# Patient Record
Sex: FEMALE | Race: WHITE | NOT HISPANIC OR LATINO | URBAN - METROPOLITAN AREA
[De-identification: names, ages, dates, MRNs, and addresses within clinical notes are randomized per-mention and may not be internally consistent; named-entity substitution may affect disease eponyms.]

---

## 2017-08-12 ENCOUNTER — EMERGENCY (EMERGENCY)
Facility: HOSPITAL | Age: 22
LOS: 1 days | Discharge: HOME | End: 2017-08-12
Attending: EMERGENCY MEDICINE
Payer: SELF-PAY

## 2017-08-12 VITALS
DIASTOLIC BLOOD PRESSURE: 78 MMHG | RESPIRATION RATE: 18 BRPM | TEMPERATURE: 99 F | OXYGEN SATURATION: 99 % | HEART RATE: 101 BPM | HEIGHT: 68 IN | SYSTOLIC BLOOD PRESSURE: 130 MMHG | WEIGHT: 169.98 LBS

## 2017-08-12 PROCEDURE — 99282 EMERGENCY DEPT VISIT SF MDM: CPT

## 2017-08-12 PROCEDURE — 99283 EMERGENCY DEPT VISIT LOW MDM: CPT

## 2017-08-12 NOTE — ED STATDOCS - ATTENDING CONTRIBUTION TO CARE
I, Kendal Cee, performed the initial face to face bedside interview with this patient regarding history of present illness, review of symptoms and relevant past medical, social and family history.  I completed an independent physical examination.  I was the initial provider who evaluated this patient.  The history, relevant review of systems, past medical and surgical history, medical decision making, and physical examination was documented by the scribe in my presence and I attest to the accuracy of the documentation.  I have signed out the follow up of any pending tests (i.e. labs, radiological studies) to the ACP.  I have communicated the patient’s plan of care and disposition with the ACP.

## 2017-08-12 NOTE — ED STATDOCS - PROGRESS NOTE DETAILS
Patient denies of contact lens or glass usage. She reports that the burning sensation has resolved.   Bilat eye exam - VA 20/20 individually uncorrected. No stain uptake with Wood's Lamp. No FB noted.   PLAN: Will discharge with EYE follow-up as needed

## 2017-08-12 NOTE — ED ADULT NURSE NOTE - CHIEF COMPLAINT QUOTE
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

## 2017-08-12 NOTE — ED STATDOCS - MEDICAL DECISION MAKING DETAILS
Will obtain visual acuity, PH, and Wood's lamp exam. Will obtain visual acuity, check pH and perform Wood's lamp exam.

## 2017-08-12 NOTE — ED ADULT TRIAGE NOTE - CHIEF COMPLAINT QUOTE
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

## 2017-08-12 NOTE — ED STATDOCS - OBJECTIVE STATEMENT
23 y/o F w/ no significant PMHx presents to the ED c/o mouth tingling and eye burning bilat s/p generator fluid slashing onto her face today. She also states she is dizzy. Pt notes the fluid smelt oily. Pt flushed her eyes out with Visine and a water fountain. She does not wear contacts. Pt denies LOC, visual changes, fever or any other complaints at this time. This patient is a 21 y/o woman w/ no significant PMHx presents to the ED c/o mouth tingling and eye burning bilat s/p generator fluid slashing onto her face today. She also states she is dizzy. Pt notes the fluid had a gasoline smell to it.  Pt flushed her eyes out with water and put Visine drops in her eyes.  Pt denies burning and visual changes.

## 2017-11-16 ENCOUNTER — EMERGENCY (EMERGENCY)
Facility: HOSPITAL | Age: 22
LOS: 1 days | Discharge: HOME | End: 2017-11-16
Attending: EMERGENCY MEDICINE
Payer: COMMERCIAL

## 2017-11-16 VITALS
DIASTOLIC BLOOD PRESSURE: 78 MMHG | WEIGHT: 175.05 LBS | RESPIRATION RATE: 18 BRPM | HEART RATE: 98 BPM | OXYGEN SATURATION: 99 % | TEMPERATURE: 98 F | SYSTOLIC BLOOD PRESSURE: 130 MMHG | HEIGHT: 68 IN

## 2017-11-16 PROCEDURE — 12001 RPR S/N/AX/GEN/TRNK 2.5CM/<: CPT

## 2017-11-16 PROCEDURE — 99282 EMERGENCY DEPT VISIT SF MDM: CPT | Mod: 25

## 2017-11-16 RX ORDER — TETANUS TOXOID, REDUCED DIPHTHERIA TOXOID AND ACELLULAR PERTUSSIS VACCINE, ADSORBED 5; 2.5; 8; 8; 2.5 [IU]/.5ML; [IU]/.5ML; UG/.5ML; UG/.5ML; UG/.5ML
0.5 SUSPENSION INTRAMUSCULAR ONCE
Qty: 0 | Refills: 0 | Status: DISCONTINUED | OUTPATIENT
Start: 2017-11-16 | End: 2017-11-16

## 2017-11-16 RX ORDER — IBUPROFEN 200 MG
800 TABLET ORAL ONCE
Qty: 0 | Refills: 0 | Status: DISCONTINUED | OUTPATIENT
Start: 2017-11-16 | End: 2017-11-16

## 2017-11-16 NOTE — ED STATDOCS - OBJECTIVE STATEMENT
23 y/o F pt with no pertinent past medical hx, presents to ED c/o lacerations on her right hand s/p washing dishes. She states she was washing dishes when one of them broke. Tetanus is not UTD. She is right hand dominant. Denies any other injuries or traumas. Denies N/V/D, fever, chills, SOB, CP, and abdominal pain. No further complaints at this time. 21 y/o F pt with no pertinent past medical hx, presents to ED c/o lacerations on her right hand s/p washing dishes. She states she was washing dishes when one of them broke. Tetanus is  UTD. She is right hand dominant. Denies any other injuries or traumas. Denies N/V/D, fever, chills, SOB, CP, and abdominal pain. No further complaints at this time.

## 2017-11-16 NOTE — ED STATDOCS - PROGRESS NOTE DETAILS
Pt seen and evaluated. Agree with HPI/PE and plan. + crescent shaped lac to 1st mcp area. No muscle, tendon or joint invol. No fb. dT UTD.  Lac repair and local wound care

## 2017-11-16 NOTE — ED STATDOCS - MEDICAL DECISION MAKING DETAILS
Laceration super track irrigation simple closure. Tetanus Laceration super track irrigation simple closure.

## 2017-11-16 NOTE — ED STATDOCS - SKIN, MLM
c shaped laceration 2cm from end to end over dorsal aspect of the right thumb just proximal to the PIP

## 2017-11-16 NOTE — ED STATDOCS - ATTENDING CONTRIBUTION TO CARE
I, Shawn Dewitt, performed the initial face to face bedside interview with this patient regarding history of present illness, review of symptoms and relevant past medical, social and family history.  I completed an independent physical examination.  I was the initial provider who evaluated this patient. I have signed out the follow up of any pending tests (i.e. labs, radiological studies) to the ACP.  I have communicated the patient’s plan of care and disposition with the ACP.

## 2022-08-06 ENCOUNTER — EMERGENCY (EMERGENCY)
Facility: HOSPITAL | Age: 27
LOS: 1 days | Discharge: HOME | End: 2022-08-06
Attending: STUDENT IN AN ORGANIZED HEALTH CARE EDUCATION/TRAINING PROGRAM
Payer: SELF-PAY

## 2022-08-06 VITALS
HEIGHT: 68 IN | SYSTOLIC BLOOD PRESSURE: 130 MMHG | DIASTOLIC BLOOD PRESSURE: 77 MMHG | OXYGEN SATURATION: 98 % | WEIGHT: 190.04 LBS | HEART RATE: 88 BPM | RESPIRATION RATE: 18 BRPM | TEMPERATURE: 98 F

## 2022-08-06 LAB
ALBUMIN SERPL ELPH-MCNC: 3.8 G/DL — SIGNIFICANT CHANGE UP (ref 3.3–5.2)
ALP SERPL-CCNC: 49 U/L — SIGNIFICANT CHANGE UP (ref 40–120)
ALT FLD-CCNC: 15 U/L — SIGNIFICANT CHANGE UP
ANION GAP SERPL CALC-SCNC: 11 MMOL/L — SIGNIFICANT CHANGE UP (ref 5–17)
APPEARANCE UR: ABNORMAL
AST SERPL-CCNC: 16 U/L — SIGNIFICANT CHANGE UP
BACTERIA # UR AUTO: ABNORMAL
BASOPHILS # BLD AUTO: 0.04 K/UL — SIGNIFICANT CHANGE UP (ref 0–0.2)
BASOPHILS NFR BLD AUTO: 0.5 % — SIGNIFICANT CHANGE UP (ref 0–2)
BILIRUB SERPL-MCNC: 0.2 MG/DL — LOW (ref 0.4–2)
BILIRUB UR-MCNC: NEGATIVE — SIGNIFICANT CHANGE UP
BUN SERPL-MCNC: 11.5 MG/DL — SIGNIFICANT CHANGE UP (ref 8–20)
CALCIUM SERPL-MCNC: 8.8 MG/DL — SIGNIFICANT CHANGE UP (ref 8.4–10.5)
CHLORIDE SERPL-SCNC: 105 MMOL/L — SIGNIFICANT CHANGE UP (ref 98–107)
CO2 SERPL-SCNC: 22 MMOL/L — SIGNIFICANT CHANGE UP (ref 22–29)
COLOR SPEC: YELLOW — SIGNIFICANT CHANGE UP
CREAT SERPL-MCNC: 0.68 MG/DL — SIGNIFICANT CHANGE UP (ref 0.5–1.3)
DIFF PNL FLD: ABNORMAL
EGFR: 122 ML/MIN/1.73M2 — SIGNIFICANT CHANGE UP
EOSINOPHIL # BLD AUTO: 0.16 K/UL — SIGNIFICANT CHANGE UP (ref 0–0.5)
EOSINOPHIL NFR BLD AUTO: 2.1 % — SIGNIFICANT CHANGE UP (ref 0–6)
EPI CELLS # UR: SIGNIFICANT CHANGE UP
GLUCOSE SERPL-MCNC: 88 MG/DL — SIGNIFICANT CHANGE UP (ref 70–99)
GLUCOSE UR QL: NEGATIVE MG/DL — SIGNIFICANT CHANGE UP
HCG SERPL-ACNC: <4 MIU/ML — SIGNIFICANT CHANGE UP
HCT VFR BLD CALC: 40.3 % — SIGNIFICANT CHANGE UP (ref 34.5–45)
HGB BLD-MCNC: 13.3 G/DL — SIGNIFICANT CHANGE UP (ref 11.5–15.5)
IMM GRANULOCYTES NFR BLD AUTO: 0.3 % — SIGNIFICANT CHANGE UP (ref 0–1.5)
KETONES UR-MCNC: NEGATIVE — SIGNIFICANT CHANGE UP
LEUKOCYTE ESTERASE UR-ACNC: ABNORMAL
LYMPHOCYTES # BLD AUTO: 2.51 K/UL — SIGNIFICANT CHANGE UP (ref 1–3.3)
LYMPHOCYTES # BLD AUTO: 33.5 % — SIGNIFICANT CHANGE UP (ref 13–44)
MCHC RBC-ENTMCNC: 30 PG — SIGNIFICANT CHANGE UP (ref 27–34)
MCHC RBC-ENTMCNC: 33 GM/DL — SIGNIFICANT CHANGE UP (ref 32–36)
MCV RBC AUTO: 91 FL — SIGNIFICANT CHANGE UP (ref 80–100)
MONOCYTES # BLD AUTO: 0.4 K/UL — SIGNIFICANT CHANGE UP (ref 0–0.9)
MONOCYTES NFR BLD AUTO: 5.3 % — SIGNIFICANT CHANGE UP (ref 2–14)
NEUTROPHILS # BLD AUTO: 4.37 K/UL — SIGNIFICANT CHANGE UP (ref 1.8–7.4)
NEUTROPHILS NFR BLD AUTO: 58.3 % — SIGNIFICANT CHANGE UP (ref 43–77)
NITRITE UR-MCNC: NEGATIVE — SIGNIFICANT CHANGE UP
PH UR: 6.5 — SIGNIFICANT CHANGE UP (ref 5–8)
PLATELET # BLD AUTO: 208 K/UL — SIGNIFICANT CHANGE UP (ref 150–400)
POTASSIUM SERPL-MCNC: 4.1 MMOL/L — SIGNIFICANT CHANGE UP (ref 3.5–5.3)
POTASSIUM SERPL-SCNC: 4.1 MMOL/L — SIGNIFICANT CHANGE UP (ref 3.5–5.3)
PROT SERPL-MCNC: 6.1 G/DL — LOW (ref 6.6–8.7)
PROT UR-MCNC: 30 MG/DL
RBC # BLD: 4.43 M/UL — SIGNIFICANT CHANGE UP (ref 3.8–5.2)
RBC # FLD: 12.9 % — SIGNIFICANT CHANGE UP (ref 10.3–14.5)
RBC CASTS # UR COMP ASSIST: ABNORMAL /HPF (ref 0–4)
SODIUM SERPL-SCNC: 138 MMOL/L — SIGNIFICANT CHANGE UP (ref 135–145)
SP GR SPEC: 1.01 — SIGNIFICANT CHANGE UP (ref 1.01–1.02)
UROBILINOGEN FLD QL: NEGATIVE MG/DL — SIGNIFICANT CHANGE UP
WBC # BLD: 7.5 K/UL — SIGNIFICANT CHANGE UP (ref 3.8–10.5)
WBC # FLD AUTO: 7.5 K/UL — SIGNIFICANT CHANGE UP (ref 3.8–10.5)
WBC UR QL: SIGNIFICANT CHANGE UP /HPF (ref 0–5)

## 2022-08-06 PROCEDURE — 87086 URINE CULTURE/COLONY COUNT: CPT

## 2022-08-06 PROCEDURE — 99284 EMERGENCY DEPT VISIT MOD MDM: CPT | Mod: 25

## 2022-08-06 PROCEDURE — 76856 US EXAM PELVIC COMPLETE: CPT | Mod: 26

## 2022-08-06 PROCEDURE — 99285 EMERGENCY DEPT VISIT HI MDM: CPT

## 2022-08-06 PROCEDURE — 80053 COMPREHEN METABOLIC PANEL: CPT

## 2022-08-06 PROCEDURE — 76830 TRANSVAGINAL US NON-OB: CPT | Mod: 26

## 2022-08-06 PROCEDURE — 99053 MED SERV 10PM-8AM 24 HR FAC: CPT

## 2022-08-06 PROCEDURE — 85025 COMPLETE CBC W/AUTO DIFF WBC: CPT

## 2022-08-06 PROCEDURE — 84702 CHORIONIC GONADOTROPIN TEST: CPT

## 2022-08-06 PROCEDURE — 76830 TRANSVAGINAL US NON-OB: CPT

## 2022-08-06 PROCEDURE — 36415 COLL VENOUS BLD VENIPUNCTURE: CPT

## 2022-08-06 PROCEDURE — 81001 URINALYSIS AUTO W/SCOPE: CPT

## 2022-08-06 PROCEDURE — 76856 US EXAM PELVIC COMPLETE: CPT

## 2022-08-06 RX ORDER — SODIUM CHLORIDE 9 MG/ML
1000 INJECTION, SOLUTION INTRAVENOUS ONCE
Refills: 0 | Status: COMPLETED | OUTPATIENT
Start: 2022-08-06 | End: 2022-08-06

## 2022-08-06 RX ADMIN — SODIUM CHLORIDE 1000 MILLILITER(S): 9 INJECTION, SOLUTION INTRAVENOUS at 02:28

## 2022-08-06 NOTE — ED PROVIDER NOTE - DATE/TIME 1
What Type Of Note Output Would You Prefer (Optional)?: Standard Output Have Your Spot(S) Been Treated In The Past?: has not been treated Hpi Title: Evaluation of Skin Lesions 06-Aug-2022 04:32

## 2022-08-06 NOTE — ED PROVIDER NOTE - CLINICAL SUMMARY MEDICAL DECISION MAKING FREE TEXT BOX
Labs and US performed. GYN consulted here. VSS, hemodynamically stable. 28 yo female with no pmhx presents with vaginal bleeding since 9 pm tonight s/p spontaneous expulsion of IUD 2 days ago. Labs and US performed. GYN consulted here. VSS, hemodynamically stable. strict return precautions explained. instructed to f/u with GYN within 24-48 hrs. instructed to f/u with pcp within 24-48 hours as well.

## 2022-08-06 NOTE — ED PROVIDER NOTE - PHYSICAL EXAMINATION
Gen: No acute distress, non toxic  HEENT: Mucous membranes moist, pink conjunctivae, EOMI  CV: RRR, nl s1/s2.  Resp: CTAB, normal rate and effort  GI: Abdomen soft, ND. +minimally ttp in suprapubic region  : No CVAT  Neuro: A&O x 3, moving all 4 extremities  MSK: No spine or joint tenderness to palpation  Skin: No rashes. intact and perfused. Gen: No acute distress, non toxic  HEENT: Mucous membranes moist, pink conjunctivae, EOMI. PERRL. Airway patent, posterior pharynx w/out erythema or exudate. Moist mucous membranes.   CV: RRR, nl s1/s2.  Resp: CTAB, normal rate and effort. No wheezes, rhonchi, or crackles.   GI: Abdomen soft, ND. +minimally ttp in suprapubic region. no guarding or rebound ttp.   : No CVAT  Neuro: A&Ox4, MAEx4. 5/5 str ext x 4. Sensation intact, symmetric throughout. No FND's.  Skin: No rashes, skin intact and well perfused. Cap refill <2sec  Vascular: Radial and dorsalis pedal pulses 2+ b/l. No calf ttp or swelling. No LE edema.

## 2022-08-06 NOTE — ED PROVIDER NOTE - PATIENT PORTAL LINK FT
You can access the FollowMyHealth Patient Portal offered by [facility] by registering at the following website: [email or web address]. By joining [employer]’s FollowMyHealth portal, you will also be able to view your health information using other applications (apps) compatible with our system.

## 2022-08-06 NOTE — ED PROVIDER NOTE - OBJECTIVE STATEMENT
28 yo female with no pmhx presents with vaginal bleeding since 9pm tonight.     Denies fever, chills, body aches, chest pain, SOB 26 yo female with no pmhx presents with vaginal bleeding since 9pm tonight. Pt states 2 stays ago, she had spontaneous expulsion of her IUD. She states when it came out, she experienced some lower abdominal cramping but no vaginal bleeding.     Denies fever, chills, body aches, chest pain, SOB 26 yo female with no pmhx presents with vaginal bleeding since 9pm tonight. Pt states 2 days ago, she had spontaneous expulsion of her IUD. She states when it came out, she experienced some lower abdominal cramping but no vaginal bleeding. She states yesterday, she felt nl and had no symptoms. Tonight she reports she started having vaginal bleeding and some vaginal pain/cramping, She reports she has gone through 6 tampons between 9pm-11:45pm. States she had some passage of "stringy thick blood." She tastes when her IUD came out, it was intact and complete, denies seeing any broken pieces. Pt states she [facility]. Denies fever, chills, body aches, dizziness, LOC, visual changes, chest pain, SOB, dysuria, frequency, paresthesias in the extremities, rashes. Pt states she is sexually active with her . Denies hx of STD's, vaginal d/c, rashes or lesions in vaginal area. 28 yo female with no pmhx presents with vaginal bleeding since 9pm tonight. Pt states 2 days ago, she had spontaneous expulsion of her IUD. She states when it came out, she experienced some lower abdominal cramping but no vaginal bleeding. She states yesterday, she felt nl and had no symptoms. Tonight she reports she started having vaginal bleeding and some vaginal pain/cramping, She reports she has gone through 6 tampons between 9pm-11:45pm. States she had some passage of "stringy thick blood." She stastes when her IUD came out, it was intact and complete, denies seeing any broken pieces. Pt states she [facility]. Denies fever, chills, body aches, dizziness, LOC, visual changes, chest pain, SOB, dysuria, frequency, paresthesias in the extremities, rashes. Pt states she is sexually active with her . Denies hx of STD's, vaginal d/c, rashes or lesions in vaginal area.

## 2022-08-06 NOTE — ED PROVIDER NOTE - NS ED ROS FT
Gen: denies fever, chills, body aches  Skin: denies rashes  HEENT: denies visual changes, ear pain, nasal congestion, throat pain  Respiratory: denies SOB, cough  Cardiovascular: denies chest pain, palpitations, diaphoresis, LE edema  GI: denies abdominal pain, n/v/d  : denies dysuria, frequency, urgency  MSK: denies joint swelling/pain, back pain, neck pain  Neuro: denies headache, dizziness, weakness, numbness Gen: denies fever, chills, body aches  Skin: denies rashes  HEENT: denies visual changes, ear pain, nasal congestion, throat pain  Respiratory: denies SOB, cough  Cardiovascular: denies chest pain, palpitations, diaphoresis, LE edema  GI: denies abdominal pain, n/v/d  : +vaginal bleeding and vaginal pain/crmaping. denies dysuria, frequency, urgency  MSK: denies joint swelling/pain, back pain, neck pain  Neuro: denies headache, dizziness, weakness, numbness

## 2022-08-06 NOTE — ED PROVIDER NOTE - NSFOLLOWUPINSTRUCTIONS_ED_ALL_ED_FT
- Please follow-up with your primary care doctor in the next 1-2 days.  Please call tomorrow for an appointment.  If you cannot follow-up with your primary care doctor please return to the ED for any urgent issues.  - Follow up with the gynecologist within 1-2 days.   - You were given a copy of the tests performed today.  Please bring the results with you and review them with your primary care doctor.  - If you have any worsening of symptoms or any other concerns please return to the ED immediately.  - Please continue taking your home medications as directed.    Provider followup given by the Gynecologist you saw here in the ER: Dr. Luis Angel Martinez, Dr. Mikel Patel, Dr. Mendel Sotelo

## 2022-08-06 NOTE — CONSULT NOTE ADULT - SUBJECTIVE AND OBJECTIVE BOX
CLEMENTE LAMA is a 27y  LMP (2022) who presents to the ED w/ complaints of vaginal bleeding x1 after spontaneous expulsion of her IUD on 8/3/2022    Patient states that on Wednesday her IUD spontaneously expulsed, denies any pain on expulsion with photographic evidence at bedside with intact Judi. Denies any pelvic/abdominal cramping at that time; however, yesterday evening, she began experiencing heavy vaginal bleeding. States that she soaked through 4 super tampons with passage of small clots. Patient grew concerned regarding bleeding, and came to the ED for evaluation. Patient denies chest pain, palpitations, shortness of breath, dizziness, lightheadedness, weakness, and feeling faint. Patient denies abdominal pain and lower abdominal cramping. Patient denies nausea, vomiting, fevers and chills. Patient denies dysuria, hematuria, back pain and urinary frequency.  Patient denies h/o fibroids, adenomyosis or history of heavy VB and AUB. Patient is sexually active w/  . Does not use condoms       OB HISTORY:   -  (2016) @ term - Female  - Surgical TOP     PAST GYN HISTORY: Denies history of abnormal paps, STDs, ovarian cysts, or uterine fibroids.   Liletta placed 2018    PAST MEDICAL HISTORY:  No pertinent past medical history      PAST SURGICAL HISTORY:  No significant past surgical history    Allergies  No Known Allergies    Intolerances        Social Hx: Current vape use. Social drinker.     ROS:  AS PER HPI    PHYSICAL EXAM-  T(C): 36.7 (22 @ 00:18), Max: 36.7 (22 @ 00:18)  HR: 88 (22 @ 00:18) (88 - 88)  BP: 130/77 (22 @ 00:18) (130/77 - 130/77)  RR: 18 (22 @ 00:18) (18 - 18)  SpO2: 98% (22 @ 00:18) (98% - 98%)    CONSTITUTIONAL: well developed no apparent distress, alert, oriented x 3.  PULMONARY: Lungs clear to auscultation   CARDIOVASCULAR: RRR   ABDOMEN: soft, non-tender, +BS, no guarding/rebound/rigidity    PELVIC:        EXTERNAL GENITALIA: atraumatic, no lesions        VAGINA: Minimal dark blood present in vaginal vault. No large clots.         CERVIX: Pink, closed                            13.3   7.50  )-----------( 208      ( 06 Aug 2022 02:28 )             40.3     08-    138  |  105  |  11.5  ----------------------------<  88  4.1   |  22.0  |  0.68    Ca    8.8      06 Aug 2022 02:28    TPro  6.1<L>  /  Alb  3.8  /  TBili  0.2<L>  /  DBili  x   /  AST  16  /  ALT  15  /  AlkPhos  49  -    SERUM bhcg    <4.0   @ 02:28      Radiology:  < from: US Pelvis Complete (US Pelvis Complete .) (22 @ 03:42) >    ACC: [id number] EXAM:  US TRANSVAGINAL                        ACC: [id number] EXAM:  US PELVIC COMPLETE                          PROCEDURE DATE:  2022          INTERPRETATION:  CLINICAL INFORMATION: Heavy vaginal bleeding. IUD came   out which was placed 4 years ago.    LMP: 2022    COMPARISON: None available.    TECHNIQUE: Endovaginal and transabdominal pelvic sonogram. Color and   Spectral Doppler was performed.    FINDINGS:  Uterus: 7.6 x 5.5 x 6.9 cm. Within normal limits. No evidence of an   intrauterine device.  Endometrium: Heterogeneously thickened measuring up to 9 mm which could   represent blood products.    Right ovary: 4.3 x 2.1 x 2.9 cm. 2.4 x 2.0 x 2.2 cm complex cyst with   layering echogenic material suspicious for a hemorrhagic cyst. Normal   arterial and venous waveforms.  Left ovary: 3.3 x 1.8 x 1.8 cm. Within normal limits. Normal arterial and   venous waveforms.    Fluid: None.    IMPRESSION:  1. Heterogeneously thickened endometrium which could represent blood   products.  2. Small complex right ovarian cyst likely hemorrhagic.      --- End of Report ---            ESTRELLA GIRALDO MD; Attending Radiologist  This document has been electronically signed. Aug  6 2022  3:54AM    < end of copied text >

## 2022-08-06 NOTE — CONSULT NOTE ADULT - ASSESSMENT
CLEMENTE LAMA is a 27y  LMP (2022) who presents to the ED w/ complaints of vaginal bleeding x1 after spontaneous expulsion of her IUD on 8/3/2022. Pelvic US demonstrated no evidence of IUD. Incidental right hemorrhagic cyst.     Plan:  - VSS  - Liletta expulsion may be associated with bleeding, cramping pain. Minimal bleeding noted on speculum exam.   - Patient counseled that light spotting/bleeding after removal may be expected; however, if excessive bleeding (large clots, saturating >1/pad an hour), signs of symptomatic anemia present, patient to return to the ED  - Patient counseled regarding back-up contraception method given IUD expulsion  - Provider f/u: Dr. Luis Angel Martinez, Dr. Mikel Patel, Dr. Mendel Sotelo  - Patient verbalized understanding at bedside.      Plan d/w Dr. Song

## 2022-08-07 LAB
CULTURE RESULTS: SIGNIFICANT CHANGE UP
SPECIMEN SOURCE: SIGNIFICANT CHANGE UP

## 2023-06-06 NOTE — ED ADULT NURSE NOTE - NS PRO PASSIVE SMOKE EXP
[Maximal Pain Intensity: 0/10] : 0/10 [90: Able to carry normal activity; minor signs or symptoms of disease.] : 90: Able to carry normal activity; minor signs or symptoms of disease.  No

## 2024-11-11 NOTE — ED ADULT NURSE NOTE - NS ED NURSE RECORD ANOTHER VITAL SIGN
Problem: At Risk for Falls  Goal: Patient does not fall  Outcome: Monitoring/Evaluating progress  Goal: Patient takes action to control fall-related risks  Outcome: Monitoring/Evaluating progress     Problem: At Risk for Injury Due to Fall  Goal: Patient does not fall  Outcome: Monitoring/Evaluating progress  Goal: Takes action to control condition specific risks  Outcome: Monitoring/Evaluating progress  Goal: Verbalizes understanding of fall-related injury personal risks  Description: Document education using the patient education activity  Outcome: Monitoring/Evaluating progress     Problem: Pain  Goal: Acceptable pain level achieved/maintained at rest using appropriate pain scale for the patient  Outcome: Monitoring/Evaluating progress  Goal: Acceptable pain level achieved/maintained with activity using appropriate pain scale for the patient  Outcome: Monitoring/Evaluating progress  Goal: Acceptable pain level achieved/maintained without oversedation  Outcome: Monitoring/Evaluating progress     Problem: Skin Integrity Alteration  Goal: Skin remains intact with no new/deterioration of wound or pressure injury  Outcome: Monitoring/Evaluating progress  Goal: Participates in wound care activities  Outcome: Monitoring/Evaluating progress     Problem: Impaired Physical Mobility  Goal: Functional status is maintained or returned to baseline during hospitalization  Outcome: Monitoring/Evaluating progress  Goal: Tolerates activity for discharge setting with no abnormal symptoms  Outcome: Monitoring/Evaluating progress     Problem: Delirium, Risk for  Goal: # No symptoms of delirium  Description: Evaluate delirium symptoms under active problem when present  Outcome: Monitoring/Evaluating progress  Goal: # Verbalizes understanding of delirium preventive strategies  Description: Document on Patient Education Activity   Outcome: Monitoring/Evaluating progress      Yes